# Patient Record
Sex: MALE | Race: WHITE | NOT HISPANIC OR LATINO | Employment: STUDENT | URBAN - METROPOLITAN AREA
[De-identification: names, ages, dates, MRNs, and addresses within clinical notes are randomized per-mention and may not be internally consistent; named-entity substitution may affect disease eponyms.]

---
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
## 2022-08-23 ENCOUNTER — OFFICE VISIT (OUTPATIENT)
Dept: FAMILY MEDICINE CLINIC | Facility: CLINIC | Age: 22
End: 2022-08-23

## 2022-08-23 VITALS
HEART RATE: 61 BPM | HEIGHT: 66 IN | BODY MASS INDEX: 23.95 KG/M2 | TEMPERATURE: 98.7 F | OXYGEN SATURATION: 97 % | RESPIRATION RATE: 16 BRPM | SYSTOLIC BLOOD PRESSURE: 110 MMHG | WEIGHT: 149 LBS | DIASTOLIC BLOOD PRESSURE: 60 MMHG

## 2022-08-23 DIAGNOSIS — R53.83 FATIGUE, UNSPECIFIED TYPE: Primary | ICD-10-CM

## 2022-08-23 DIAGNOSIS — F33.1 MODERATE EPISODE OF RECURRENT MAJOR DEPRESSIVE DISORDER (HCC): ICD-10-CM

## 2022-08-23 PROCEDURE — 99214 OFFICE O/P EST MOD 30 MIN: CPT | Performed by: FAMILY MEDICINE

## 2022-08-23 NOTE — PROGRESS NOTES
FAMILY PRACTICE OFFICE VISIT       NAME: Cristian Officer  AGE: 25 y o  SEX: male       : 2000        MRN: [id number]        Assessment and Plan     1  Fatigue, unspecified type  Assessment & Plan:  Patient mother is concerned about symptoms of fatigue  I suspect that his symptoms are primarily related to depression  Patient offers no complaints of headaches, fevers, joint pains or any other symptoms  He will proceed with routine blood work  Patient remains on multivitamin and vitamin-D  Orders:  -     CBC and differential; Future  -     Comprehensive metabolic panel; Future  -     TSH, 3rd generation; Future  -     Vitamin B12; Future    2  Moderate episode of recurrent major depressive disorder [facility])  Assessment & Plan:  Patient presents with ongoing symptoms of anhedonia, lack of motivation and drive, poor sleep, sadness and irritability  He reports occasional symptoms of anxiety and infrequent panic attacks  Patient was treated for symptoms of anxiety and depression in the past with sertraline  He discontinued medication a while ago, he does not recall why  We discussed medication options going forward  Patient may benefit from either Wellbutrin of fluoxetine  He is not ready to make his decision about start of medication therapy but will strongly consider it  New  He is interested in start of counseling and will be scheduling it shortly  Patient will consider medication options and will get back to me with his decision  I have spent 25 minutes with Patient and family today in which greater than 50% of this time was spent in counseling/coordination of care regarding Risks and benefits of tx options, Intructions for management, Patient and family education, Importance of tx compliance, Risk factor reductions and Impressions  There are no Patient Instructions on file for this visit  Return if symptoms worsen or fail to improve      Discussed with the patient and all questioned fully answered  He will call me if any problems arise  M*Modal software was used to dictate this note  It may contain errors with dictating incorrect words/spelling  Please contact provider directly with any questions  Chief Complaint     Chief Complaint   Patient presents with    Physical Exam       History of Present Illness     Patient presents for evaluation of depression and fatigue  He is here today accompanied by his mother  No daily prescription medications  Patient is college student, also works  He lives with parents  Changed majors- going back to college this fall  Trouble sleeping at night  Appetite is decreased   Life feels that his life is "DULL", reports symptoms of depression and anhedonia  No suicidal homicidal ideation or thought  Poor sleep at night ,sleeps too long in am and daytime  Occ  panic attacks -they are infrequent, patient feels that depression is the primarily cause of his fatigue  Irritable easily  No headaches     He lost weight, primarily due to effort with routine exercise and healthy diet  Patient's mother is concerned that he has been skipping meals  Patient reports that he is not hungry due to nervousness and depression  Patient states that he exercises on a regular basis  He goes to the gym, weightlifting and some cardia  He feels well while exercising  Patient has not been in counseling but is actively looking in to start of therapy  Review of Systems   Review of Systems   Constitutional: Positive for fatigue  HENT: Negative  Eyes: Negative  Respiratory: Negative  Cardiovascular: Negative  Gastrointestinal: Negative  Endocrine: Negative  Genitourinary: Negative  Musculoskeletal: Negative  Allergic/Immunologic: Negative  Neurological: Negative  Hematological: Negative  Psychiatric/Behavioral: Positive for dysphoric mood and sleep disturbance   Negative for suicidal ideas  The patient is nervous/anxious  Active Problem List     Patient Active Problem List   Diagnosis    Moderate episode of recurrent major depressive disorder (HCC)    Insomnia    JAGRUTI (generalized anxiety disorder)    Elevated alkaline phosphatase level    Concern about growth    Fatigue       Past Medical History:  History reviewed  No pertinent past medical history  Past Surgical History:  History reviewed  No pertinent surgical history      Family History:  Family History   Problem Relation Age of Onset    Diabetes Father     Bipolar disorder Father     No Known Problems Mother        Social History:  Social History     Socioeconomic History    Marital status: Single     Spouse name: Not on file    Number of children: Not on file    Years of education: Not on file    Highest education level: Not on file   Occupational History    Not on file   Tobacco Use    Smoking status: Never Smoker    Smokeless tobacco: Never Used   Vaping Use    Vaping Use: Never used   Substance and Sexual Activity    Alcohol use: Never    Drug use: Never    Sexual activity: Not on file   Other Topics Concern    Not on file   Social History Narrative    Not on file     Social Determinants of Health     Financial Resource Strain: Not on file   Food Insecurity: Not on file   Transportation Needs: Not on file   Physical Activity: Not on file   Stress: Not on file   Social Connections: Not on file   Intimate Partner Violence: Not on file   Housing Stability: Not on file         Objective     Vitals:    08/23/22 1243   BP: 110/60   BP Location: Left arm   Patient Position: Sitting   Cuff Size: Standard   Pulse: 61   Resp: 16   Temp: 98 7 °F (37 1 °C)   TempSrc: Temporal   SpO2: 97%   Weight: 67 6 kg (149 lb)   Height: 5' 6" (1 676 m)       Wt Readings from Last 3 Encounters:   08/23/22 67 6 kg (149 lb) (40 %, Z= -0 26)*   04/27/21 71 2 kg (157 lb) (60 %, Z= 0 25)*   01/12/21 74 9 kg (165 lb 3 2 oz) (72 %, Z= 0 59)*     * Growth percentiles are based on CDC (Boys, 2-20 Years) data  Physical Exam  Vitals and nursing note reviewed  Constitutional:       Appearance: Normal appearance  Neurological:      General: No focal deficit present  Mental Status: He is alert and oriented to person, place, and time  Cranial Nerves: No cranial nerve deficit  Psychiatric:         Mood and Affect: Mood normal          Behavior: Behavior normal          Thought Content: Thought content normal           Pertinent Laboratory/Diagnostic Studies:    No results found for: WBC, HGB, HCT, MCV, PLT    No results found for: TSH    No results found for: CHOL  No results found for: TRIG  No results found for: HDL  No results found for: 1811 AMT  Lab Results   Component Value Date    HGBA1C 5 2 02/05/2020     No results found for: SODIUM, K, CL, CO2, ANIONGAP, AGAP, BUN, CREATININE, GLUC, GLUF, CALCIUM, AST, ALT, ALKPHOS, PROT, TP, BILITOT, TBILI, EGFR    Orders Placed This Encounter   Procedures    CBC and differential    Comprehensive metabolic panel    TSH, 3rd generation    Vitamin B12       ALLERGIES:  No Known Allergies    Current Medications     Current Outpatient Medications   Medication Sig Dispense Refill    VITAMIN D PO Take 1 tablet by mouth in the morning       No current facility-administered medications for this visit         Medications Discontinued During This Encounter   Medication Reason    sertraline (ZOLOFT) 50 mg tablet Therapy completed       Health Maintenance     Health Maintenance   Topic Date Due    Hepatitis C Screening  Never done    HPV Vaccine (1 - Male 2-dose series) Never done    HIV Screening  Never done    Annual Physical  Never done    COVID-19 Vaccine (3 - Booster for Pfizer series) 03/06/2022    Influenza Vaccine (1) 09/01/2022    BMI: Adult  08/23/2023    Depression Remission PHQ  08/23/2023    DTaP,Tdap,and Td Vaccines (6 - Td or Tdap) 03/19/2024    Hepatitis B Vaccine Completed    IPV Vaccine  Completed    Meningococcal ACWY Vaccine  Completed    Pneumococcal Vaccine: Pediatrics (0 to 5 Years) and At-Risk Patients (6 to 59 Years)  Aged Out    HIB Vaccine  Aged Out    Hepatitis A Vaccine  Aged [employer] History   Administered Date(s) Administered    COVID-19 PFIZER VACCINE 0 3 ML IM 09/15/2021, 10/06/2021    DTaP,unspecified 04/23/2003, 06/04/2003, 07/09/2003, 09/29/2004    Hep B, Adolescent or Pediatric 10/15/2010, 12/08/2010, 03/19/2014    IPV 04/23/2003, 07/09/2003, 09/29/2004, 01/23/2007    MMR 04/18/2008, 03/08/2019    Measles 02/27/2004    Meningococcal ACWY, unspecified 03/19/2014, 02/27/2019    Meningococcal Polysaccharide (MPSV4) 03/19/2014, 02/27/2019    Mumps 02/27/2004    Td (adult), Unspecified 10/30/2007    Tdap 03/19/2014       Cailin Sosa MD

## 2022-08-27 PROBLEM — R53.83 FATIGUE: Status: ACTIVE | Noted: 2022-08-27

## 2022-08-27 NOTE — ASSESSMENT & PLAN NOTE
Patient presents with ongoing symptoms of anhedonia, lack of motivation and drive, poor sleep, sadness and irritability  He reports occasional symptoms of anxiety and infrequent panic attacks  Patient was treated for symptoms of anxiety and depression in the past with sertraline  He discontinued medication a while ago, he does not recall why  We discussed medication options going forward  Patient may benefit from either Wellbutrin of fluoxetine  He is not ready to make his decision about start of medication therapy but will strongly consider it  New  He is interested in start of counseling and will be scheduling it shortly  Patient will consider medication options and will get back to me with his decision

## 2022-08-27 NOTE — ASSESSMENT & PLAN NOTE
Patient mother is concerned about symptoms of fatigue  I suspect that his symptoms are primarily related to depression  Patient offers no complaints of headaches, fevers, joint pains or any other symptoms  He will proceed with routine blood work      Patient remains on multivitamin and vitamin-D

## 2023-01-23 ENCOUNTER — OFFICE VISIT (OUTPATIENT)
Dept: FAMILY MEDICINE CLINIC | Facility: CLINIC | Age: 23
End: 2023-01-23

## 2023-01-23 VITALS
SYSTOLIC BLOOD PRESSURE: 104 MMHG | HEIGHT: 66 IN | DIASTOLIC BLOOD PRESSURE: 60 MMHG | WEIGHT: 143 LBS | TEMPERATURE: 98.2 F | RESPIRATION RATE: 16 BRPM | BODY MASS INDEX: 22.98 KG/M2 | HEART RATE: 103 BPM | OXYGEN SATURATION: 99 %

## 2023-01-23 DIAGNOSIS — F33.1 MODERATE EPISODE OF RECURRENT MAJOR DEPRESSIVE DISORDER (HCC): Primary | ICD-10-CM

## 2023-01-23 DIAGNOSIS — L65.9 HAIR LOSS: ICD-10-CM

## 2023-01-23 RX ORDER — BUPROPION HYDROCHLORIDE 150 MG/1
150 TABLET ORAL EVERY MORNING
Qty: 90 TABLET | Refills: 1 | Status: SHIPPED | OUTPATIENT
Start: 2023-01-23 | End: 2023-01-25 | Stop reason: SDUPTHER

## 2023-01-23 RX ORDER — FINASTERIDE 1 MG/1
1 TABLET, FILM COATED ORAL DAILY
COMMUNITY

## 2023-01-23 NOTE — PROGRESS NOTES
Name: Anna Marroquin      : 2000      MRN: [id number]  Encounter Provider: Kathleen Dial MD  Encounter Date: 2023   Encounter department: [address] [place] [address]      1  Moderate episode of recurrent major depressive disorder (HCC)  Assessment & Plan:  Start Wellbutrin  mg daily  Follow-up 4 to 6 weeks, patient will update me via [employer] or will schedule follow-up appointment  2  Hair loss  -     Testosterone, free, total; Future  -     Vitamin D 25 hydroxy; Future      Return in about 6 weeks (around 3/6/2023)  Subjective     Follow up    Lack motivation and drive  not in counseling  Patient is concerned about  testoterone deficiency  Frontal balding, same as his father  Patient started Propecia 1 mg daily over-the-counter  He admits to chronic symptoms of dysthymia depression that he has been battling for quite a while  He was not ready to start antidepressant during our last office visit but would like to try medication now  No history of seizures  Patient denies any alcohol use  He is college student specializing in graphic design  Review of Systems   Constitutional: Positive for fatigue  Respiratory: Negative  Cardiovascular: Negative  Skin:        Frontal balding   Psychiatric/Behavioral: Positive for dysphoric mood  History reviewed  No pertinent past medical history  History reviewed  No pertinent surgical history    Family History   Problem Relation Age of Onset   • Diabetes Father    • Bipolar disorder Father    • No Known Problems Mother      Social History     Socioeconomic History   • Marital status: Single     Spouse name: None   • Number of children: None   • Years of education: None   • Highest education level: None   Occupational History   • None   Tobacco Use   • Smoking status: Never   • Smokeless tobacco: Never   Vaping Use   • Vaping Use: Never used   Substance and Sexual Activity   • Alcohol use: Never   • Drug use: Never   • Sexual activity: Yes     Partners: Female   Other Topics Concern   • None   Social History Narrative   • None     Social Determinants of Health     Financial Resource Strain: Not on file   Food Insecurity: Not on file   Transportation Needs: Not on file   Physical Activity: Not on file   Stress: Not on file   Social Connections: Not on file   Intimate Partner Violence: Not on file   Housing Stability: Not on file     Current Outpatient Medications on File Prior to Visit   Medication Sig   • finasteride (PROPECIA) 1 MG tablet Take 1 mg by mouth daily   • VITAMIN D PO Take 1 tablet by mouth in the morning (Patient not taking: Reported on 1/23/2023)     No Known Allergies  Immunization History   Administered Date(s) Administered   • COVID-19 PFIZER VACCINE 0 3 ML IM 09/15/2021, 10/06/2021   • DTaP,unspecified 04/23/2003, 06/04/2003, 07/09/2003, 09/29/2004   • Hep B, Adolescent or Pediatric 10/15/2010, 12/08/2010, 03/19/2014   • INFLUENZA 12/28/2022   • IPV 04/23/2003, 07/09/2003, 09/29/2004, 01/23/2007   • MMR 04/18/2008, 03/08/2019   • Measles 02/27/2004   • Meningococcal ACWY, unspecified 03/19/2014, 02/27/2019   • Meningococcal Polysaccharide (MPSV4) 03/19/2014, 02/27/2019   • Mumps 02/27/2004   • Td (adult), Unspecified 10/30/2007   • Tdap 03/19/2014       Objective     /60 (BP Location: Left arm, Patient Position: Sitting, Cuff Size: Standard)   Pulse 103   Temp 98 2 °F (36 8 °C) (Temporal)   Resp 16   Ht 5' 6" (1 676 m)   Wt 64 9 kg (143 lb)   SpO2 99%   BMI 23 08 kg/m²     Physical Exam  Constitutional:       General: He is not in acute distress  Appearance: Normal appearance  He is not ill-appearing  Neurological:      General: No focal deficit present  Mental Status: He is alert and oriented to person, place, and time  Psychiatric:         Mood and Affect: Mood normal          Behavior: Behavior normal          Thought Content:  Thought content normal  Alena Watts MD

## 2023-01-25 DIAGNOSIS — F33.1 MODERATE EPISODE OF RECURRENT MAJOR DEPRESSIVE DISORDER (HCC): ICD-10-CM

## 2023-01-25 RX ORDER — BUPROPION HYDROCHLORIDE 150 MG/1
150 TABLET ORAL EVERY MORNING
Qty: 90 TABLET | Refills: 1 | Status: SHIPPED | OUTPATIENT
Start: 2023-01-25

## 2023-01-29 NOTE — ASSESSMENT & PLAN NOTE
Start Wellbutrin  mg daily  Follow-up 4 to 6 weeks, patient will update me via MyChart or will schedule follow-up appointment

## 2023-05-08 DIAGNOSIS — L65.9 HAIR LOSS: Primary | ICD-10-CM

## 2025-06-08 ENCOUNTER — OFFICE VISIT (OUTPATIENT)
Dept: URGENT CARE | Facility: MEDICAL CENTER | Age: 25
End: 2025-06-08
Payer: COMMERCIAL

## 2025-06-08 VITALS
TEMPERATURE: 98.1 F | DIASTOLIC BLOOD PRESSURE: 55 MMHG | SYSTOLIC BLOOD PRESSURE: 103 MMHG | OXYGEN SATURATION: 99 % | RESPIRATION RATE: 18 BRPM | HEART RATE: 82 BPM

## 2025-06-08 DIAGNOSIS — R21 RASH: Primary | ICD-10-CM

## 2025-06-08 PROCEDURE — S9088 SERVICES PROVIDED IN URGENT: HCPCS

## 2025-06-08 PROCEDURE — 99203 OFFICE O/P NEW LOW 30 MIN: CPT

## 2025-06-08 RX ORDER — MAGNESIUM 30 MG
TABLET ORAL 2 TIMES DAILY
COMMUNITY

## 2025-06-08 RX ORDER — MULTIVITAMIN WITH IRON
TABLET ORAL DAILY
COMMUNITY

## 2025-06-08 RX ORDER — PREDNISONE 10 MG/1
TABLET ORAL
Qty: 42 TABLET | Refills: 0 | Status: SHIPPED | OUTPATIENT
Start: 2025-06-08

## 2025-06-08 NOTE — PATIENT INSTRUCTIONS
Prescribed course of prednisone, take as directed.  Avoid use of NSAID medications while taking this, such as ibuprofen products relief.  May take Tylenol as needed if you have aches or pains.  Contact your PCP to schedule follow-up for further evaluation of symptoms and to discuss supplement use.    Follow up with PCP in 3-5 days.  Proceed to  ER if symptoms worsen.    If tests are performed, our office will contact you with results only if changes need to made to the care plan discussed with you at the visit. You can review your full results on [facility].    Patient Education     Contact dermatitis   The Basics   Written by the doctors and editors at [facility]   What is dermatitis? -- Dermatitis is a type of skin rash that can happen after your skin touches something that irritates it or something you are allergic to.  Things that irritate the skin can be found in products that you use every day, such as soaps or cleansers. Some of the things that can cause skin allergies include:   Certain medicines, perfumes, or cosmetics   The metal in some kinds of jewelry   Plants, such as poison ivy and poison oak  Sometimes, you can develop a rash the first time you touch something. But it is also possible to get a rash from something that you have used before without any problems.  What other symptoms should I watch for? -- If you have a rash, your skin might be dry, itchy, or cracked (picture 1). In people with light skin, the rash is often red. In people with darker skin, it might appear purple, brown, gray, or black (picture 2). If your rash is caused by an allergy, you might also have some swelling or blisters where you have the rash.  Severe symptoms include:   Pain   Widespread swelling   Blisters, oozing, or crusting of the skin  Is there anything I can do on my own? -- Yes. You can:   Avoid using or touching whatever might have caused your rash.   Protect your skin from anything that might irritate it or  cause an allergy. For example, wear gloves if you need to work with harsh soaps.   Use cool or warm water, not hot, for baths and showers. You can also try a special kind of bath called an oatmeal bath.   Try using soothing skin products to help with the itching and discomfort. Examples include thick moisturizing cream or petroleum jelly. Put this on your skin right after you get out of the bath or shower and after washing your hands.   Avoid scratching your skin. It might help to:   Wear cotton gloves at night.   Keep your nails short and clean.   Cover the parts of your skin that itch.  How are skin rashes treated? -- Your doctor might prescribe different treatments or medicines to help your rash heal. These can include:   Steroid creams and ointments - These go on the skin, and they relieve itching and redness.   Steroid pills - You might need to take these for a short time if your rash is severe. But your doctor or nurse will want to take you off of the steroid pills as soon as possible. Even though these medicines help, they can also cause problems of their own.   Wet or damp dressings - These can be helpful for skin that is crusting or oozing. To use a wet or damp dressing, you need to wear 2 layers of clothing. First, put on a layer of damp cotton clothes over your rash. Then, put on a layer of dry clothes on top of the damp ones. People who need these dressings often wear them at night when they sleep.  When should I call the doctor? -- Call your doctor or nurse for advice if:   You have a rash that does not go away within 2 weeks.   Your rash gets worse or spreads over large parts of your body.   You have signs of infection like swelling, warmth, pain, or fever.  All topics are updated as new evidence becomes available and our peer review process is complete.  This topic retrieved from [employer] on: Feb 26, 2024.  Topic 37107 Version 12.0  Release: 32.2.4 - C32.56  © 2024 UpToDate, Inc. and/or its  affiliates. All rights reserved.  picture 1: Chronic irritant contact dermatitis     If you have dermatitis, your skin might be red, dry, itchy, or cracked.  Graphic 691393 Version 2.0  picture 2: Dermatitis caused by nickel allergy     This person has an allergy to nickel, a type of metal. They have dermatitis where the button of their jeans touched their skin.  Graphic 631115 Version 1.0  Consumer Information Use and Disclaimer   Disclaimer: This generalized information is a limited summary of diagnosis, treatment, and/or medication information. It is not meant to be comprehensive and should be used as a tool to help the user understand and/or assess potential diagnostic and treatment options. It does NOT include all information about conditions, treatments, medications, side effects, or risks that may apply to a specific patient. It is not intended to be medical advice or a substitute for the medical advice, diagnosis, or treatment of a health care provider based on the health care provider's examination and assessment of a patient's specific and unique circumstances. Patients must speak with a health care provider for complete information about their health, medical questions, and treatment options, including any risks or benefits regarding use of medications. This information does not endorse any treatments or medications as safe, effective, or approved for treating a specific patient. UpToDate, Inc. and its affiliates disclaim any warranty or liability relating to this information or the use thereof.The use of this information is governed by the Terms of Use, available at https://www.woltersSamtecuwer.com/en/know/clinical-effectiveness-terms. 2024© UpToDate, Inc. and its affiliates and/or licensors. All rights reserved.  Copyright   © 2024 UpToDate, Inc. and/or its affiliates. All rights reserved.

## 2025-06-08 NOTE — PROGRESS NOTES
[facility]        NAME: Chio Goncalves is a 24 y.o. male  : 2000    MRN: [id number]  DATE: 2025  TIME: 4:51 PM    Assessment and Plan   Rash [R21]  1. Rash  predniSONE 10 mg tablet        Undetermined cause of rash, possible contact dermatitis to poison ivy.  Prescribed course of prednisone.  Advised other symptomatic treatments.  Discussed PCP follow-up for further evaluation and to discuss supplement use.    Patient Instructions     Prescribed course of prednisone, take as directed.  Avoid use of NSAID medications while taking this, such as ibuprofen products relief.  May take Tylenol as needed if you have aches or pains.  Contact your PCP to schedule follow-up for further evaluation of symptoms and to discuss supplement use.    Follow up with PCP in 3-5 days.  Proceed to  ER if symptoms worsen.    If tests are performed, our office will contact you with results only if changes need to made to the care plan discussed with you at the visit. [facility].    Chief Complaint     Chief Complaint   Patient presents with    Rash     Patient c/o a rash on his bilateral lower legs.          History of Present Illness       Patient presents with sister for evaluation of rash.  Notes symptoms started approximately a week to week and a half ago.  The rash is localized to his lower legs.  Describes it as red and itchy.  He feels that it is becoming more red, increasing in itchiness, and spreading slightly.  He also feels his lips have been dry.  Denies sores in the mouth or sore throat.  Denies any new exposures but states he is outside a lot in nature.  Does note that he recently discontinued multiple supplements after seeing the rash, including vitamin D3, L-Tyrosine, B12, Lion's destiny, magnesium.  Sister notes concern for stage IV delayed hypersensitivity reaction, she notes this is sometimes seen with Moriah dixon.    Rash  This is a new problem. The current  episode started 1 to 4 weeks ago. The problem has been gradually worsening since onset. Location: Lower legs. The rash is characterized by redness and itchiness. He was exposed to nothing. Pertinent negatives include no cough, diarrhea, fever, shortness of breath or vomiting. There were no sick contacts.       Review of Systems   Review of Systems   Constitutional:  Negative for activity change, appetite change, chills and fever.   HENT:  Negative for facial swelling and trouble swallowing.    Respiratory:  Negative for cough, choking, chest tightness, shortness of breath, wheezing and stridor.    Gastrointestinal:  Negative for abdominal pain, diarrhea, nausea and vomiting.   Musculoskeletal:  Negative for myalgias.   Skin:  Positive for rash.         Current Medications     Current Medications[1]    Current Allergies     Allergies as of 06/08/2025    (No Known Allergies)            The following portions of the patient's history were reviewed and updated as appropriate: allergies, current medications, past family history, past medical history, past social history, past surgical history and problem list.     Past Medical History[2]    Past Surgical History[3]    Family History[4]      Medications have been verified.        Objective   /55   Pulse 82   Temp 98.1 °F (36.7 °C)   Resp 18   SpO2 99%        Physical Exam     Physical Exam  Vitals and nursing note reviewed.   Constitutional:       General: He is not in acute distress.     Appearance: Normal appearance. He is not ill-appearing.   HENT:      Mouth/Throat:      Mouth: Mucous membranes are moist.      Pharynx: Oropharynx is clear. No oropharyngeal exudate or posterior oropharyngeal erythema.      Comments: Lips appear dry, no cheilitis    Cardiovascular:      Rate and Rhythm: Normal rate and regular rhythm.      Pulses: Normal pulses.      Heart sounds: Normal heart sounds. No murmur heard.  Pulmonary:      Effort: Pulmonary effort is normal. No  respiratory distress.      Breath sounds: Normal breath sounds. No stridor. No wheezing, rhonchi or rales.   Abdominal:      General: Abdomen is flat. There is no distension.      Palpations: Abdomen is soft.      Tenderness: There is no abdominal tenderness. There is no guarding.     Skin:     Findings: Rash (Raised erythematous rash to lower legs, no vesicles noted) present.     Neurological:      Mental Status: He is alert.                        [1]   Current Outpatient Medications:     predniSONE 10 mg tablet, Take 6 tablets (60 mg total) once daily for 2 days, then take 5 tablets (50 mg total) once daily for 2 days, then take 4 tablets (40 mg total) once daily for 2 days, then take 3 tablets (30 mg total) once daily for 2 days, then take 2 tablets (20 mg total) once daily for 2 days, then take 1 tablet (10 mg total) once daily for 2 days., Disp: 42 tablet, Rfl: 0    buPROPion (Wellbutrin XL) 150 mg 24 hr tablet, Take 1 tablet (150 mg total) by mouth every morning (Patient not taking: Reported on 6/8/2025), Disp: 90 tablet, Rfl: 1    finasteride (PROPECIA) 1 MG tablet, Take 1 mg by mouth daily, Disp: , Rfl:     magnesium 30 MG tablet, Take by mouth 2 (two) times a day (Patient not taking: Reported on 6/8/2025), Disp: , Rfl:     NON FORMULARY, lion destiny (Patient not taking: Reported on 6/8/2025), Disp: , Rfl:     Tyrosine 500 MG CAPS, Take by mouth (Patient not taking: Reported on 6/8/2025), Disp: , Rfl:     vitamin B-12 (CYANOCOBALAMIN) 50 MCG tablet, Take by mouth daily (Patient not taking: Reported on 6/8/2025), Disp: , Rfl:     VITAMIN D PO, Take 1 tablet by mouth in the morning (Patient not taking: Reported on 1/23/2023), Disp: , Rfl:   [2] No past medical history on file.  [3] No past surgical history on file.  [4]   Family History  Problem Relation Name Age of Onset    Diabetes Father      Bipolar disorder Father      No Known Problems Mother